# Patient Record
Sex: MALE | Race: WHITE | Employment: FULL TIME | URBAN - METROPOLITAN AREA
[De-identification: names, ages, dates, MRNs, and addresses within clinical notes are randomized per-mention and may not be internally consistent; named-entity substitution may affect disease eponyms.]

---
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
## 2017-12-21 DIAGNOSIS — T78.2XXS ANAPHYLAXIS DUE TO EXERCISE, SEQUELA: ICD-10-CM

## 2017-12-21 DIAGNOSIS — L50.2 URTICARIA DUE TO HEAT: ICD-10-CM

## 2017-12-21 RX ORDER — EPINEPHRINE 0.3 MG/.3ML
0.3 INJECTION SUBCUTANEOUS
Qty: 2 SYRINGE | Refills: 0 | Status: SHIPPED | OUTPATIENT
Start: 2017-12-21 | End: 2018-03-26 | Stop reason: SDUPTHER

## 2017-12-21 RX ORDER — EPINEPHRINE 0.3 MG/.3ML
0.3 INJECTION SUBCUTANEOUS
Qty: 2 SYRINGE | Refills: 0 | Status: SHIPPED | OUTPATIENT
Start: 2017-12-21 | End: 2017-12-21 | Stop reason: SDUPTHER

## 2017-12-21 NOTE — TELEPHONE ENCOUNTER
----- Message from Isaac Sales sent at 12/21/2017 12:29 PM EST -----  Regarding: Dr. Lisandra Ye / ravindra Farrell is requesting  A reill of his epipen be sent to the [facility] [address] [facility] [address] in [address] [place] [address] and best contact number is  [phone].

## 2018-03-26 ENCOUNTER — OFFICE VISIT (OUTPATIENT)
Dept: INTERNAL MEDICINE CLINIC | Age: 53
End: 2018-03-26

## 2018-03-26 VITALS
HEIGHT: 69 IN | HEART RATE: 49 BPM | RESPIRATION RATE: 12 BRPM | WEIGHT: 179.8 LBS | BODY MASS INDEX: 26.63 KG/M2 | SYSTOLIC BLOOD PRESSURE: 120 MMHG | DIASTOLIC BLOOD PRESSURE: 78 MMHG | TEMPERATURE: 97.8 F

## 2018-03-26 DIAGNOSIS — T78.2XXS ANAPHYLAXIS DUE TO EXERCISE, SEQUELA: ICD-10-CM

## 2018-03-26 DIAGNOSIS — Z85.820 HISTORY OF MELANOMA: ICD-10-CM
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
## 2018-03-26 DIAGNOSIS — S46.912A STRAIN OF LEFT SHOULDER, INITIAL ENCOUNTER: ICD-10-CM

## 2018-03-26 DIAGNOSIS — Z00.00 WELL ADULT EXAM: Primary | ICD-10-CM

## 2018-03-26 DIAGNOSIS — L50.2 URTICARIA DUE TO HEAT: ICD-10-CM

## 2018-03-26 RX ORDER — EPINEPHRINE 0.3 MG/.3ML
0.3 INJECTION SUBCUTANEOUS
Qty: 2 SYRINGE | Refills: 2 | Status: SHIPPED | OUTPATIENT
Start: 2018-03-26 | End: 2018-12-20 | Stop reason: SDUPTHER

## 2018-03-26 RX ORDER — ZOSTER VACCINE RECOMBINANT, ADJUVANTED 50 MCG/0.5
0.5 KIT INTRAMUSCULAR ONCE
Qty: 0.5 ML | Refills: 0 | Status: SHIPPED | OUTPATIENT
Start: 2018-03-26 | End: 2018-03-26

## 2018-03-26 NOTE — PROGRESS NOTES
Presents for a PE. He is fasting for labs. He had a colonoscopy. Will get report. Had a really long cold that lasted several months. Having left shoulder/arm pain. Plays soccer. Overtime it is getting better.

## 2018-03-26 NOTE — MR AVS SNAPSHOT
[address] [facility] 
 
 
 [address] [facility] [address] [facility] 
[phone] Patient: Mckenzie Salgado MRN: [id number] :1965 Visit Information Date & Time Provider Department Dept. Phone Encounter #  
 3/26/2018  8:15 AM Odette Valencia MD [facility] [address] [id number] Upcoming Health Maintenance Date Due COLONOSCOPY 1984 Pneumococcal 19-64 Highest Risk (1 of 3 - PCV13) 2031* DTaP/Tdap/Td series (2 - Td) 3/25/2025 *Topic was postponed. The date shown is not the original due date. Allergies as of 3/26/2018  Review Complete On: 3/26/2018 By: Odette Valencia MD  
 No Known Allergies Current Immunizations  Reviewed on 3/26/2018 Name Date Hep B Vaccine 2001 Influenza Vaccine 10/9/2014 Td 2001 Tdap 3/25/2015 Reviewed by Odette Valencia MD on 3/26/2018 at  8:41 AM  
You Were Diagnosed With   
  
 Codes Comments Well adult exam    -  Primary ICD-10-CM: Z00.00 ICD-9-CM: V70.0 History of melanoma     ICD-10-CM: Z85.820 ICD-9-CM: V10.82 Strain of left shoulder, initial encounter     ICD-10-CM: Z51.117J ICD-9-CM: 840.9 Vitals BP Pulse Temp Resp Height(growth percentile) Weight(growth percentile) 120/78 (BP 1 Location: Left arm, BP Patient Position: Sitting) (!) 49 97.8 °F (36.6 °C) (Oral) 12 5' 9\" (1.753 m) 179 lb 12.8 oz (81.6 kg) BMI Smoking Status 26.55 kg/m2 Never Smoker Vitals History BMI and BSA Data Body Mass Index Body Surface Area  
 26.55 kg/m 2 1.99 m 2 Preferred Pharmacy Pharmacy Name Phone [id number] [address] [phone] Your Updated Medication List  
  
   
This list is accurate as of 3/26/18  8:51 AM.  Always use your most recent med list.  
  
  
  
  
 EPINEPHrine 0.3 mg/0.3 mL injection Commonly known as:  Vicci Stair  
 0.3 mL by IntraMUSCular route daily as needed. APPOINTMENT NEEDED SHINGRIX (PF) 50 mcg/0.5 mL Susr injection Generic drug:  varicella-zoster recombinant (PF)  
0.5 mL by IntraMUSCular route once for 1 dose. Receive 2nd dose in 6 months. For Shingles (Zoster) prevention Prescriptions Printed Refills SHINGRIX, PF, 50 mcg/0.5 mL susr injection 0 Si.5 mL by IntraMUSCular route once for 1 dose. Receive 2nd dose in 6 months. For Shingles (Zoster) prevention Class: Print Route: IntraMUSCular We Performed the Following CBC W/O DIFF [50253 CPT(R)] LIPID PANEL [50875 CPT(R)] METABOLIC PANEL, COMPREHENSIVE [85019 CPT(R)] PSA W/ REFLX FREE PSA [10163 CPT(R)] Introducing [facility]! [facility] introduces "[employer]" patient portal. Now you can access parts of your medical record, email your doctor's office, and request medication refills online. 1. In your internet browser, go to [email or web address] 2. Click on the First Time User? Click Here link in the Sign In box. You will see the New Member Sign Up page. 3. Enter your "[employer]" Access Code exactly as it appears below. You will not need to use this code after youve completed the sign-up process. If you do not sign up before the expiration date, you must request a new code. · "[employer]" Access Code: [facility] CRE Expires: 2018  8:51 AM 
 
4. Enter the last four digits of your Social Security Number (xxxx) and Date of Birth (mm/dd/yyyy) as indicated and click Submit. You will be taken to the next sign-up page. 5. Create a Practice Ignitiont ID. This will be your "[employer]" login ID and cannot be changed, so think of one that is secure and easy to remember. 6. Create a [employer] password. You can change your password at any time. 7. Enter your Password Reset Question and Answer. This can be used at a later time if you forget your password. 8. Enter your e-mail address. You will receive e-mail notification when new information is available in [address] 9. Click Sign Up. You can now view and download portions of your medical record. 10. Click the Download Summary menu link to download a portable copy of your medical information. If you have questions, please visit the Frequently Asked Questions section of the [employer] website. Remember, [employer] is NOT to be used for urgent needs. For medical emergencies, dial 911. Now available from your iPhone and Android! Please provide this summary of care documentation to your next provider. Your primary care clinician is listed as Martin Six. If you have any questions after today's visit, please call [phone].

## 2018-03-27 LAB
ALBUMIN SERPL-MCNC: 4.8 G/DL (ref 3.5–5.5)
ALBUMIN/GLOB SERPL: 1.7 {RATIO} (ref 1.2–2.2)
ALP SERPL-CCNC: 80 IU/L (ref 39–117)
ALT SERPL-CCNC: 26 IU/L (ref 0–44)
AST SERPL-CCNC: 25 IU/L (ref 0–40)
BILIRUB SERPL-MCNC: 0.6 MG/DL (ref 0–1.2)
BUN SERPL-MCNC: 14 MG/DL (ref 6–24)
BUN/CREAT SERPL: 14 (ref 9–20)
CALCIUM SERPL-MCNC: 9.6 MG/DL (ref 8.7–10.2)
CHLORIDE SERPL-SCNC: 101 MMOL/L (ref 96–106)
CHOLEST SERPL-MCNC: 219 MG/DL (ref 100–199)
CO2 SERPL-SCNC: 22 MMOL/L (ref 18–29)
CREAT SERPL-MCNC: 1.03 MG/DL (ref 0.76–1.27)
ERYTHROCYTE [DISTWIDTH] IN BLOOD BY AUTOMATED COUNT: 14.7 % (ref 12.3–15.4)
GFR SERPLBLD CREATININE-BSD FMLA CKD-EPI: 84 ML/MIN/1.73
GFR SERPLBLD CREATININE-BSD FMLA CKD-EPI: 97 ML/MIN/1.73
GLOBULIN SER CALC-MCNC: 2.8 G/DL (ref 1.5–4.5)
GLUCOSE SERPL-MCNC: 95 MG/DL (ref 65–99)
HCT VFR BLD AUTO: 45.7 % (ref 37.5–51)
HDLC SERPL-MCNC: 50 MG/DL
HGB BLD-MCNC: 15.2 G/DL (ref 13–17.7)
INTERPRETATION, 910389: NORMAL
LDLC SERPL CALC-MCNC: 139 MG/DL (ref 0–99)
MCH RBC QN AUTO: 29.3 PG (ref 26.6–33)
MCHC RBC AUTO-ENTMCNC: 33.3 G/DL (ref 31.5–35.7)
MCV RBC AUTO: 88 FL (ref 79–97)
PLATELET # BLD AUTO: 245 X10E3/UL (ref 150–379)
POTASSIUM SERPL-SCNC: 5.3 MMOL/L (ref 3.5–5.2)
PROT SERPL-MCNC: 7.6 G/DL (ref 6–8.5)
PSA SERPL-MCNC: 0.9 NG/ML (ref 0–4)
RBC # BLD AUTO: 5.18 X10E6/UL (ref 4.14–5.8)
REFLEX CRITERIA: NORMAL
SODIUM SERPL-SCNC: 145 MMOL/L (ref 134–144)
TRIGL SERPL-MCNC: 148 MG/DL (ref 0–149)
VLDLC SERPL CALC-MCNC: 30 MG/DL (ref 5–40)
WBC # BLD AUTO: 8.4 X10E3/UL (ref 3.4–10.8)

## 2018-03-27 NOTE — PROGRESS NOTES
Shania Tompkins is a 47 y.o. male  Presenting for his annual checkup and health maintenance review and follow-up    Doing well. Exercising and playing soccer. Recent fall w left shoulder injury 2 months ago. It is improving. Recent URI for 3 months. Improved over the past month. Currently asymptomatic    Exercise: very active  Diet: generally follows a low fat low cholesterol diet  Health Maintenance   Topic Date Due    COLONOSCOPY  06/01/1984    DTaP/Tdap/Td series (2 - Td) 03/25/2025    Influenza Age 5 to Adult  Completed     Health Maintenance reviewed  Last digital rectal exam:  none  Lab Results   Component Value Date/Time    Prostate Specific Ag 0.8 08/29/2016 10:54 AM       Vaccinations reviewed  Immunization History   Administered Date(s) Administered    Hep B Vaccine 01/01/2001    Influenza Vaccine 10/09/2014    Td 01/01/2001    Tdap 03/25/2015       Past Medical History:   Diagnosis Date    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 Brain Lucio. w/u neg for obvious food allergy (but possible wheat/beef mild rxn)    AR (allergic rhinitis)     Bell's palsy 1995    FH: CAD (coronary artery disease)     father w stent    Melanoma ([facility] [address] 2008    sees Dr. Robbin Nelson. LLE. neg nodes/ 6 year clearance with Dr Jenny Shi 8/2014    Shingles 2004    Skin cancer screening     Dr. Rossy Sheikh every 6 months    Urticaria due to heat 8/29/2016      has a past surgical history that includes hx other surgical (Left, 2008); hx acl reconstruction (Right, 2004); and hx colonoscopy (2016). Review of patient's allergies indicates no known allergies. Current Outpatient Prescriptions   Medication Sig    SHINGRIX, PF, 50 mcg/0.5 mL susr injection 0.5 mL by IntraMUSCular route once for 1 dose. Receive 2nd dose in 6 months. For Shingles (Zoster) prevention    EPINEPHrine (EPIPEN) 0.3 mg/0.3 mL injection 0.3 mL by IntraMUSCular route daily as needed.      No current facility-administered medications for this visit. SOCIAL HX:  reports that he has never smoked. He has never used smokeless tobacco. He reports that he drinks alcohol. He reports that he does not use illicit drugs. FAMILY HX: family history includes Coronary Artery Disease in his father; High Cholesterol in his mother; Hypertension in his mother; Liver Disease in his father; Other (age of onset: 2) in his brother; Other (age of onset: 62) in his mother. Review of Systems - History obtained from the patient  General ROS: negative for - night sweats, weight gain or weight loss  Cardiovascular ROS: no chest pain, dyspnea on exertion, edema    Physical exam  Blood pressure 120/78, pulse (!) 49, temperature 97.8 °F (36.6 °C), temperature source Oral, resp. rate 12, height 5' 9\" (1.753 m), weight 179 lb 12.8 oz (81.6 kg). Wt Readings from Last 3 Encounters:   03/26/18 179 lb 12.8 oz (81.6 kg)   08/29/16 179 lb (81.2 kg)   08/18/15 172 lb (78 kg)     He appears well, alert and oriented x 3, pleasant and cooperative. Vitals as noted. No rashes or significant lesions. Neck supple and free of adenopathy, or masses. No thyromegaly or carotid bruits. Cranial nerves normal. Lungs are clear to auscultation. Heart sounds are normal with no murmurs, clicks, gallops or rubs. Abdomen is soft, non- tender, with no masses or organomegaly. Extremities, peripheral pulses and reflexes are normal.  . RECTAL/PROSTATE EXAM: smooth and symmetric without nodules or tenderness. Skin is without rashes or suspicious lesions. Assessment and Plan:    1. Well adult exam  - SHINGRIX, PF, 50 mcg/0.5 mL susr injection; 0.5 mL by IntraMUSCular route once for 1 dose. Receive 2nd dose in 6 months. For Shingles (Zoster) prevention  Dispense: 0.5 mL; Refill: 0  - LIPID PANEL  - METABOLIC PANEL, COMPREHENSIVE  - PSA W/ REFLX FREE PSA  - CBC W/O DIFF    2. History of melanoma  Seeing derm. No suspicious lesions on my exam    3.  Strain of left shoulder, initial encounter  Stretching exercises demonstrated for for this condition      4. 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   - EPINEPHrine (EPIPEN) 0.3 mg/0.3 mL injection; 0.3 mL by IntraMUSCular route daily as needed. Dispense: 2 Syringe;  Refill: 2      The patient is asked to make an attempt to improve diet and exercise patterns  Avoid tobacco products, excessive alcohol    Return for yearly wellness visits
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
## 2018-12-20 DIAGNOSIS — T78.2XXS ANAPHYLAXIS DUE TO EXERCISE, SEQUELA: ICD-10-CM

## 2018-12-20 DIAGNOSIS — L50.2 URTICARIA DUE TO HEAT: ICD-10-CM

## 2018-12-20 RX ORDER — EPINEPHRINE 0.3 MG/.3ML
0.3 INJECTION SUBCUTANEOUS
Qty: 2 SYRINGE | Refills: 2 | Status: SHIPPED | OUTPATIENT
Start: 2018-12-20 | End: 2020-06-22 | Stop reason: SDUPTHER

## 2018-12-20 NOTE — TELEPHONE ENCOUNTER
Patient states his Epi Pen will be  expiring in a couple days , requesting a renewed script for another year.  [facility] on file

## 2019-04-09 ENCOUNTER — OFFICE VISIT (OUTPATIENT)
Dept: INTERNAL MEDICINE CLINIC | Age: 54
End: 2019-04-09

## 2019-04-09 VITALS
HEIGHT: 69 IN | RESPIRATION RATE: 18 BRPM | WEIGHT: 178.13 LBS | OXYGEN SATURATION: 98 % | TEMPERATURE: 97.9 F | HEART RATE: 45 BPM | DIASTOLIC BLOOD PRESSURE: 88 MMHG | BODY MASS INDEX: 26.38 KG/M2 | SYSTOLIC BLOOD PRESSURE: 137 MMHG

## 2019-04-09 DIAGNOSIS — T78.2XXS ANAPHYLAXIS DUE TO EXERCISE, SEQUELA: ICD-10-CM
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
## 2019-04-09 DIAGNOSIS — Z85.820 HISTORY OF MELANOMA: ICD-10-CM

## 2019-04-09 DIAGNOSIS — Z00.00 WELL ADULT EXAM: Primary | ICD-10-CM

## 2019-04-09 NOTE — PATIENT INSTRUCTIONS
Body Mass Index: Care Instructions Your Care Instructions Body mass index (BMI) can help you see if your weight is raising your risk for health problems. It uses a formula to compare how much you weigh with how tall you are. · A BMI lower than 18.5 is considered underweight. · A BMI between 18.5 and 24.9 is considered healthy. · A BMI between 25 and 29.9 is considered overweight. A BMI of 30 or higher is considered obese. If your BMI is in the normal range, it means that you have a lower risk for weight-related health problems. If your BMI is in the overweight or obese range, you may be at increased risk for weight-related health problems, such as high blood pressure, heart disease, stroke, arthritis or joint pain, and diabetes. If your BMI is in the underweight range, you may be at increased risk for health problems such as fatigue, lower protection (immunity) against illness, muscle loss, bone loss, hair loss, and hormone problems. BMI is just one measure of your risk for weight-related health problems. You may be at higher risk for health problems if you are not active, you eat an unhealthy diet, or you drink too much alcohol or use tobacco products. Follow-up care is a key part of your treatment and safety. Be sure to make and go to all appointments, and call your doctor if you are having problems. It's also a good idea to know your test results and keep a list of the medicines you take. How can you care for yourself at home? · Practice healthy eating habits. This includes eating plenty of fruits, vegetables, whole grains, lean protein, and low-fat dairy. · If your doctor recommends it, get more exercise. Walking is a good choice. Bit by bit, increase the amount you walk every day. Try for at least 30 minutes on most days of the week. · Do not smoke. Smoking can increase your risk for health problems.  If you need help quitting, talk to your doctor about stop-smoking programs and medicines. These can increase your chances of quitting for good. · Limit alcohol to 2 drinks a day for men and 1 drink a day for women. Too much alcohol can cause health problems. If you have a BMI higher than 25 · Your doctor may do other tests to check your risk for weight-related health problems. This may include measuring the distance around your waist. A waist measurement of more than 40 inches in men or 35 inches in women can increase the risk of weight-related health problems. · Talk with your doctor about steps you can take to stay healthy or improve your health. You may need to make lifestyle changes to lose weight and stay healthy, such as changing your diet and getting regular exercise. If you have a BMI lower than 18.5 · Your doctor may do other tests to check your risk for health problems. · Talk with your doctor about steps you can take to stay healthy or improve your health. You may need to make lifestyle changes to gain or maintain weight and stay healthy, such as getting more healthy foods in your diet and doing exercises to build muscle. Where can you learn more? Go to http://jorje-ruddy.info/. Enter S176 in the search box to learn more about \"Body Mass Index: Care Instructions. \" Current as of: October 13, 2016 Content Version: 11.4 © 0342-7096 [employer]wise[employer]orporated. Care instructions adapted under license by [employer] (which disclaims liability or warranty for this information). If you have questions about a medical condition or this instruction, always ask your healthcare professional. [address] any warranty or liability for your use of this information.

## 2019-04-09 NOTE — PROGRESS NOTES
Isidoro Powers is a 47 y.o. male Presenting for his annual checkup and health maintenance review and follow-up He had an allergic rxn after eating beef and exercising . Asks about beta-gal.   
 
Exercise: moderately active Diet: generally follows a low fat low cholesterol diet Health Maintenance Topic Date Due  Shingrix Vaccine Age 50> (1 of 2) 06/01/2016  Influenza Age 5 to Adult  08/01/2019  
 DTaP/Tdap/Td series (2 - Td) 03/25/2025  COLONOSCOPY  12/06/2026  Pneumococcal 0-64 years  Aged Out Health Maintenance reviewed Last digital rectal exam:  2018 Lab Results Component Value Date/Time  
 Prostate Specific Ag 0.9 03/26/2018 09:16 AM  
 Prostate Specific Ag 0.8 08/29/2016 10:54 AM  
 
 
Vaccinations reviewed Immunization History Administered Date(s) Administered  Hep B Vaccine 01/01/2001  Influenza Vaccine 10/09/2014, 10/01/2018  Td 01/01/2001  Tdap 03/25/2015 Past Medical History:  
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 Mckenzie. w/u neg for obvious food allergy (but possible wheat/beef mild rxn)  AR (allergic rhinitis)  Bell's palsy 19  
 FH: CAD (coronary artery disease) father w stent  Melanoma ([facility] [address] 2008  
 sees Dr. Nicola Romano. LLE. neg nodes/ 6 year clearance with Dr Ector Zamora 8/2014  Shingles 2004  
 [place] every 6 months  Urticaria due to heat 8/29/2016  
 
 has a past surgical history that includes hx other surgical (Left, 2008); hx acl reconstruction (Right, 2004); and hx colonoscopy (12/06/2016). Patient has no known allergies. Current Outpatient Medications Medication Sig  EPINEPHrine (EPIPEN) 0.3 mg/0.3 mL injection 0.3 mL by IntraMUSCular route daily as needed. No current facility-administered medications for this visit. SOCIAL HX:  reports that he has never smoked.  He has never used smokeless tobacco. He reports that he drinks alcohol. He reports that he does not use drugs. FAMILY HX: family history includes Coronary Artery Disease (age of onset: 76) in his father; High Cholesterol in his mother; Hypertension in his mother; Liver Disease in his father; Other (age of onset: 2) in his brother; Other (age of onset: 62) in his mother. Review of Systems - History obtained from the patient General ROS: negative for - night sweats, weight gain or weight loss Cardiovascular ROS: no chest pain, dyspnea on exertion, edema Physical exam 
Blood pressure 137/88, pulse (!) 45, temperature 97.9 °F (36.6 °C), temperature source Oral, resp. rate 18, height 5' 9\" (1.753 m), weight 178 lb 2 oz (80.8 kg), SpO2 98 %. Wt Readings from Last 3 Encounters:  
04/09/19 178 lb 2 oz (80.8 kg) 03/26/18 179 lb 12.8 oz (81.6 kg) 08/29/16 179 lb (81.2 kg) He appears well, alert and oriented x 3, pleasant and cooperative. Vitals as noted. No rashes or significant lesions. Neck supple and free of adenopathy, or masses. No thyromegaly or carotid bruits. Cranial nerves normal. Lungs are clear to auscultation. Heart sounds are normal with no murmurs, clicks, gallops or rubs. Abdomen is soft, non- tender, with no masses or organomegaly. Extremities, peripheral pulses and reflexes are normal.  . RECTAL/PROSTATE EXAM: deferred. Skin is without rashes or suspicious lesions. Assessment and Plan: 
 
1. Well adult exam 
- LIPID PANEL 
- METABOLIC PANEL, COMPREHENSIVE 
- CBC W/O DIFF 
- PSA W/ REFLX FREE PSA 2. 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   Will check lab - ALPHA-GAL FOOD PANEL 3. History of melanoma Seeing derm The patient is asked to make an attempt to improve diet and exercise patterns Avoid tobacco products, excessive alcohol Return for yearly wellness visits Discussed the patient's BMI with him. The BMI follow up plan is as follows: dietary management education, guidance, and counseling 
encourage exercise 
monitor weight 
prescribed dietary intake An After Visit Summary was printed and given to the patient.

## 2019-04-12 LAB
ALBUMIN SERPL-MCNC: 4.7 G/DL (ref 3.5–5.5)
ALBUMIN/GLOB SERPL: 2 {RATIO} (ref 1.2–2.2)
ALP SERPL-CCNC: 75 IU/L (ref 39–117)
ALPHA-GAL IGE QN: 3.52 KU/L
ALT SERPL-CCNC: 25 IU/L (ref 0–44)
AST SERPL-CCNC: 20 IU/L (ref 0–40)
BEEF IGE QN: 2.1 KU/L
BILIRUB SERPL-MCNC: 0.6 MG/DL (ref 0–1.2)
BUN SERPL-MCNC: 13 MG/DL (ref 6–24)
BUN/CREAT SERPL: 14 (ref 9–20)
CALCIUM SERPL-MCNC: 9.2 MG/DL (ref 8.7–10.2)
CHLORIDE SERPL-SCNC: 104 MMOL/L (ref 96–106)
CHOLEST SERPL-MCNC: 187 MG/DL (ref 100–199)
CO2 SERPL-SCNC: 27 MMOL/L (ref 20–29)
CREAT SERPL-MCNC: 0.96 MG/DL (ref 0.76–1.27)
DEPRECATED BEEF IGE RAST QL: 2
DEPRECATED LAMB IGE RAST QL: 2
DEPRECATED PORK IGE RAST QL: 2
ERYTHROCYTE [DISTWIDTH] IN BLOOD BY AUTOMATED COUNT: 14.2 % (ref 12.3–15.4)
GLOBULIN SER CALC-MCNC: 2.3 G/DL (ref 1.5–4.5)
GLUCOSE SERPL-MCNC: 97 MG/DL (ref 65–99)
HCT VFR BLD AUTO: 44.6 % (ref 37.5–51)
HDLC SERPL-MCNC: 42 MG/DL
HGB BLD-MCNC: 14.8 G/DL (ref 13–17.7)
INTERPRETATION, 910389: NORMAL
LAMB IGE QN: 1.23 KU/L
LDLC SERPL CALC-MCNC: 115 MG/DL (ref 0–99)
MCH RBC QN AUTO: 29.2 PG (ref 26.6–33)
MCHC RBC AUTO-ENTMCNC: 33.2 G/DL (ref 31.5–35.7)
MCV RBC AUTO: 88 FL (ref 79–97)
PLATELET # BLD AUTO: 276 X10E3/UL (ref 150–379)
PORK IGE QN: 1.52 KU/L
POTASSIUM SERPL-SCNC: 4.9 MMOL/L (ref 3.5–5.2)
PROT SERPL-MCNC: 7 G/DL (ref 6–8.5)
PSA SERPL-MCNC: 0.8 NG/ML (ref 0–4)
RBC # BLD AUTO: 5.07 X10E6/UL (ref 4.14–5.8)
REFLEX CRITERIA: NORMAL
SODIUM SERPL-SCNC: 144 MMOL/L (ref 134–144)
TRIGL SERPL-MCNC: 151 MG/DL (ref 0–149)
VLDLC SERPL CALC-MCNC: 30 MG/DL (ref 5–40)
WBC # BLD AUTO: 6.4 X10E3/UL (ref 3.4–10.8)

## 2019-04-21 DIAGNOSIS — Z91.018 ALLERGY TO ALPHA-GAL: ICD-10-CM

## 2020-06-22 DIAGNOSIS — L50.2 URTICARIA DUE TO HEAT: ICD-10-CM

## 2020-06-22 DIAGNOSIS — T78.2XXS ANAPHYLAXIS DUE TO EXERCISE, SEQUELA: ICD-10-CM
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
## 2020-06-22 NOTE — TELEPHONE ENCOUNTER
----- Message from Gene Martshannan sent at 2020  2:08 PM EDT -----  Regarding: Dr. Antonio Habermann: [phone] (if not patient): Jumana Lively  Relationship of caller (if not patient): Wife  Best contact number(s): [phone] (Home), [phone] (cell)  Name of medication and dosage if known: Epipen (2)  Is patient out of this medication (yes/no): Yes, . Pharmacy name: [id number] listed in chart? (yes/no): No, this is a new pharmacy.   Pharmacy phone number: [phone]  Date of last visit: 2019 09:00 AM  Details to clarify the request:

## 2020-06-23 RX ORDER — EPINEPHRINE 0.3 MG/.3ML
0.3 INJECTION SUBCUTANEOUS
Qty: 2 SYRINGE | Refills: 2 | Status: SHIPPED | OUTPATIENT
Start: 2020-06-23

## 2020-10-05 ENCOUNTER — OFFICE VISIT (OUTPATIENT)
Dept: INTERNAL MEDICINE CLINIC | Age: 55
End: 2020-10-05
Payer: COMMERCIAL

## 2020-10-05 VITALS
DIASTOLIC BLOOD PRESSURE: 84 MMHG | WEIGHT: 177 LBS | SYSTOLIC BLOOD PRESSURE: 134 MMHG | HEIGHT: 67 IN | HEART RATE: 51 BPM | BODY MASS INDEX: 27.78 KG/M2 | OXYGEN SATURATION: 96 % | RESPIRATION RATE: 16 BRPM | TEMPERATURE: 98 F

## 2020-10-05 DIAGNOSIS — Z91.018 ALLERGY TO ALPHA-GAL: ICD-10-CM

## 2020-10-05 DIAGNOSIS — Z00.00 WELL ADULT EXAM: Primary | ICD-10-CM

## 2020-10-05 DIAGNOSIS — Z85.820 HISTORY OF MELANOMA: ICD-10-CM

## 2020-10-05 PROCEDURE — 99396 PREV VISIT EST AGE 40-64: CPT | Performed by: INTERNAL MEDICINE

## 2020-10-05 RX ORDER — KETOCONAZOLE 20 MG/G
CREAM TOPICAL
COMMUNITY
Start: 2020-09-23

## 2020-10-05 RX ORDER — ZOSTER VACCINE RECOMBINANT, ADJUVANTED 50 MCG/0.5
0.5 KIT INTRAMUSCULAR ONCE
Qty: 0.5 ML | Refills: 1 | Status: SHIPPED | OUTPATIENT
Start: 2020-10-05 | End: 2020-10-05

## 2020-10-06 NOTE — PROGRESS NOTES
Rosibel Carrero is a 48 y.o. male  Presenting for his annual checkup and health maintenance review and follow-up    He is playing a soccer since COVID-19. Alpha gal allergy. Has not had a recent reaction, but admits to less intense exercise. He does eat small amounts of red meat. No recent reaction. Seeing dermatology every 6 months. Did have a biopsy of a facial lesion. Benign. Exercise: moderately active  Diet: generally follows a low fat low cholesterol diet  Health Maintenance   Topic Date Due    Shingrix Vaccine Age 49> (1 of 2) 06/01/2016    Flu Vaccine (1) 09/01/2020    Lipid Screen  04/09/2024    DTaP/Tdap/Td series (2 - Td) 03/25/2025    Colonoscopy  12/06/2026    Pneumococcal 0-64 years  Aged Out     Health Maintenance reviewed  Last digital rectal exam:  2018  Lab Results   Component Value Date/Time    Prostate Specific Ag 0.8 04/09/2019 09:44 AM    Prostate Specific Ag 0.9 03/26/2018 09:16 AM    Prostate Specific Ag 0.8 08/29/2016 10:54 AM       Vaccinations reviewed  Immunization History   Administered Date(s) Administered    Hep B Vaccine 01/01/2001    Influenza Vaccine 10/09/2014, 10/01/2018    Influenza Vaccine (Quad) PF (>6 Mo Flulaval, Fluarix, and >3 Yrs Gurley, Fluzone [id number]) 10/18/2016, 10/25/2017, 11/12/2019    Td 01/01/2001    Tdap 03/25/2015       Past Medical History:   Diagnosis Date    Allergy to alpha-gal     mild exertional beef, pork, lamb allergy 4/2019. 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 Blanca Sosa. wheat, beef. alpha-gal ab + 4/2019    AR (allergic rhinitis)     Bell's palsy 1995    FH: CAD (coronary artery disease)     father w stent    Melanoma ([address] 2008    sees Dr. Alberta MADRIGAL.  neg nodes/ 6 year clearance with Dr Angélica Caceres 8/2014    Shingles 2004    Skin cancer screening     Dr. Chao He every 6 months    Urticaria due to heat 8/29/2016      has a past surgical history that includes hx other surgical (Left, 2008); hx acl reconstruction (Right, 2004); hx colonoscopy (12/06/2016); hx mohs procedure (02/2020); and hx acl reconstruction (Left, 05/22/2019). Beef containing products   Current Outpatient Medications   Medication Sig    ketoconazole (NIZORAL) 2 % topical cream 1 APPLICATION TO AREAS OF RASH TWICE A DAY    Shingrix, PF, 50 mcg/0.5 mL susr injection 0.5 mL by IntraMUSCular route once for 1 dose. Receive 2nd dose in 2-6 months. For Shingles (Zoster) prevention    EPINEPHrine (EpiPen) 0.3 mg/0.3 mL injection 0.3 mL by IntraMUSCular route daily as needed for Allergic Response. No current facility-administered medications for this visit. SOCIAL HX:  reports that he has never smoked. He has never used smokeless tobacco. He reports current alcohol use. He reports that he does not use drugs. FAMILY HX: family history includes Coronary Artery Disease (age of onset: 76) in his father; High Cholesterol in his mother; Hypertension in his mother; Liver Disease in his father; Other (age of onset: 2) in his brother; Other (age of onset: 62) in his mother. Review of Systems - History obtained from the patient  General ROS: negative for - night sweats, weight gain or weight loss  Cardiovascular ROS: no chest pain, dyspnea on exertion, edema    Physical exam  Blood pressure 134/84, pulse (!) 51, temperature 98 °F (36.7 °C), resp. rate 16, height 5' 7\" (1.702 m), weight 177 lb (80.3 kg), SpO2 96 %. Wt Readings from Last 3 Encounters:   10/05/20 177 lb (80.3 kg)   04/09/19 178 lb 2 oz (80.8 kg)   03/26/18 179 lb 12.8 oz (81.6 kg)     He appears well, alert and oriented x 3, pleasant and cooperative. Vitals as noted. No rashes or significant lesions. Neck supple and free of adenopathy, or masses. No thyromegaly or carotid bruits. Cranial nerves normal. Lungs are clear to auscultation. Heart sounds are normal with no murmurs, clicks, gallops or rubs.  Abdomen is soft, non- tender, with no masses or organomegaly. Extremities, peripheral pulses and reflexes are normal.  . RECTAL/PROSTATE EXAM: deferred. Skin is without rashes or suspicious lesions. Assessment and Plan:    1. Well adult exam  We will get seasonal flu vaccine later this month with his wife. Recommend Shingrix. - LIPID PANEL  - METABOLIC PANEL, COMPREHENSIVE  - CBC W/O DIFF  - PSA W/ REFLX FREE PSA    2. 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  - ALPHA-GAL FOOD PANEL    3. History of melanoma  Seeing derm      The patient is asked to make an attempt to improve diet and exercise patterns  Avoid tobacco products, excessive alcohol    Return for yearly wellness visits          Discussed the patient's BMI with him. The BMI follow up plan is as follows:     dietary management education, guidance, and counseling  encourage exercise  monitor weight  prescribed dietary intake    An After Visit Summary was printed and given to the patient.

## 2020-10-09 LAB
ALBUMIN SERPL-MCNC: 4.9 G/DL (ref 3.8–4.9)
ALBUMIN/GLOB SERPL: 2 {RATIO} (ref 1.2–2.2)
ALP SERPL-CCNC: 76 IU/L (ref 39–117)
ALPHA-GAL IGE QN: 0.7 KU/L
ALT SERPL-CCNC: 36 IU/L (ref 0–44)
AST SERPL-CCNC: 31 IU/L (ref 0–40)
BEEF IGE QN: 0.47 KU/L
BILIRUB SERPL-MCNC: 0.6 MG/DL (ref 0–1.2)
BUN SERPL-MCNC: 13 MG/DL (ref 6–24)
BUN/CREAT SERPL: 14 (ref 9–20)
CALCIUM SERPL-MCNC: 9.8 MG/DL (ref 8.7–10.2)
CHLORIDE SERPL-SCNC: 101 MMOL/L (ref 96–106)
CHOLEST SERPL-MCNC: 210 MG/DL (ref 100–199)
CO2 SERPL-SCNC: 25 MMOL/L (ref 20–29)
CREAT SERPL-MCNC: 0.94 MG/DL (ref 0.76–1.27)
DEPRECATED BEEF IGE RAST QL: 1
DEPRECATED LAMB IGE RAST QL: ABNORMAL
DEPRECATED PORK IGE RAST QL: ABNORMAL
ERYTHROCYTE [DISTWIDTH] IN BLOOD BY AUTOMATED COUNT: 13.5 % (ref 11.6–15.4)
GLOBULIN SER CALC-MCNC: 2.4 G/DL (ref 1.5–4.5)
GLUCOSE SERPL-MCNC: 90 MG/DL (ref 65–99)
HCT VFR BLD AUTO: 47.4 % (ref 37.5–51)
HDLC SERPL-MCNC: 50 MG/DL
HGB BLD-MCNC: 15.9 G/DL (ref 13–17.7)
INTERPRETATION, 910389: NORMAL
LAMB IGE QN: 0.26 KU/L
LDLC SERPL CALC-MCNC: 142 MG/DL (ref 0–99)
MCH RBC QN AUTO: 29 PG (ref 26.6–33)
MCHC RBC AUTO-ENTMCNC: 33.5 G/DL (ref 31.5–35.7)
MCV RBC AUTO: 87 FL (ref 79–97)
PLATELET # BLD AUTO: 257 X10E3/UL (ref 150–450)
PORK IGE QN: 0.32 KU/L
POTASSIUM SERPL-SCNC: 5.4 MMOL/L (ref 3.5–5.2)
PROT SERPL-MCNC: 7.3 G/DL (ref 6–8.5)
PSA SERPL-MCNC: 0.8 NG/ML (ref 0–4)
RBC # BLD AUTO: 5.48 X10E6/UL (ref 4.14–5.8)
REFLEX CRITERIA: NORMAL
SODIUM SERPL-SCNC: 140 MMOL/L (ref 134–144)
TRIGL SERPL-MCNC: 99 MG/DL (ref 0–149)
VLDLC SERPL CALC-MCNC: 18 MG/DL (ref 5–40)
WBC # BLD AUTO: 6.9 X10E3/UL (ref 3.4–10.8)

## 2020-10-26 ENCOUNTER — TELEPHONE (OUTPATIENT)
Dept: INTERNAL MEDICINE CLINIC | Age: 55
End: 2020-10-26

## 2020-10-26 DIAGNOSIS — R91.8 PULMONARY NODULES: Primary | ICD-10-CM

## 2020-10-26 DIAGNOSIS — C43.9 MALIGNANT MELANOMA, UNSPECIFIED SITE (HCC): ICD-10-CM

## 2020-10-26 NOTE — TELEPHONE ENCOUNTER
Returned call to pt. He was advised for a heart scan, he must call to schedule the appointment. Number provided to pt.  He thanks

## 2020-10-26 NOTE — TELEPHONE ENCOUNTER
Patient is following up on letter received , states he would like PCP to order the CT mentioned in the letter, aware once order is placed and received by our imaging scheduling  Department then he will receive a call to get scheduled , patient verbalized understanding.

## 2020-10-28 ENCOUNTER — TRANSCRIBE ORDER (OUTPATIENT)
Dept: REGISTRATION | Age: 55
End: 2020-10-28

## 2020-10-28 DIAGNOSIS — Z29.9 PREVENTIVE MEASURE: Primary | ICD-10-CM

## 2020-11-06 ENCOUNTER — HOSPITAL ENCOUNTER (OUTPATIENT)
Dept: CT IMAGING | Age: 55
Discharge: HOME | End: 2020-11-06
Payer: SELF-PAY

## 2020-11-06 DIAGNOSIS — Z29.9 PREVENTIVE MEASURE: ICD-10-CM

## 2020-11-06 PROCEDURE — 75571 CT HRT W/O DYE W/CA TEST: CPT

## 2020-11-08 PROBLEM — R93.1 AGATSTON CORONARY ARTERY CALCIUM SCORE LESS THAN 100: Status: ACTIVE | Noted: 2018-11-01

## 2020-11-08 PROBLEM — R91.8 PULMONARY NODULES: Status: ACTIVE | Noted: 2020-11-01

## 2020-11-08 NOTE — TELEPHONE ENCOUNTER
Let him know that his heart scan shows a very low score (8), so his risk of heart attacks in the next 10 years is low. We may consider repeating this in 5 years. Scan also shows 4 small lung nodules. These are small and likely benign. However, since he has a distant hx of cancer, I recommend repeating a CT scan of his lungs in 6 months. I have ordered this. Hopefully these nodule will be the same and no follow-up will we needed after 6 montrhs.

## 2020-11-08 NOTE — PROGRESS NOTES
Your calcium score is only 8 and this is good news. You do have some tiny little nodules in your lungs but based on the guidelines recommends no follow-up. I would discuss this with your primary care.

## 2020-11-09 ENCOUNTER — TELEPHONE (OUTPATIENT)
Dept: INTERNAL MEDICINE CLINIC | Age: 55
End: 2020-11-09

## 2020-11-09 NOTE — TELEPHONE ENCOUNTER
Pt called pt for test results, I advise of the his test results per pcp. I told pt the scheduling will call to set up ct scan in 6 months.

## 2021-04-20 ENCOUNTER — TELEPHONE (OUTPATIENT)
Dept: INTERNAL MEDICINE CLINIC | Age: 56
End: 2021-04-20

## 2021-05-07 ENCOUNTER — HOSPITAL ENCOUNTER (OUTPATIENT)
Dept: CT IMAGING | Age: 56
Discharge: HOME | End: 2021-05-07
Attending: INTERNAL MEDICINE
Payer: COMMERCIAL

## 2021-05-07 DIAGNOSIS — R91.8 PULMONARY NODULES: ICD-10-CM

## 2021-05-07 DIAGNOSIS — C43.9 MALIGNANT MELANOMA, UNSPECIFIED SITE (HCC): ICD-10-CM

## 2021-05-07 PROCEDURE — 71260 CT THORAX DX C+: CPT

## 2021-05-07 PROCEDURE — 74011000636 HC RX REV CODE- 636: Performed by: RADIOLOGY

## 2021-05-07 RX ADMIN — IOPAMIDOL 100 ML: 612 INJECTION, SOLUTION INTRAVENOUS at 12:55

## 2022-01-19 ENCOUNTER — OFFICE VISIT (OUTPATIENT)
Dept: INTERNAL MEDICINE CLINIC | Age: 57
End: 2022-01-19
Payer: COMMERCIAL

## 2022-01-19 VITALS
SYSTOLIC BLOOD PRESSURE: 124 MMHG | WEIGHT: 181.6 LBS | DIASTOLIC BLOOD PRESSURE: 82 MMHG | OXYGEN SATURATION: 100 % | RESPIRATION RATE: 16 BRPM | HEART RATE: 44 BPM | HEIGHT: 67 IN | BODY MASS INDEX: 28.5 KG/M2 | TEMPERATURE: 97.4 F

## 2022-01-19 DIAGNOSIS — Z00.00 PREVENTATIVE HEALTH CARE: Primary | ICD-10-CM

## 2022-01-19 DIAGNOSIS — Z11.59 ENCOUNTER FOR HEPATITIS C SCREENING TEST FOR LOW RISK PATIENT: ICD-10-CM

## 2022-01-19 DIAGNOSIS — R00.1 BRADYCARDIA: ICD-10-CM

## 2022-01-19 DIAGNOSIS — Z85.820 HISTORY OF MELANOMA: ICD-10-CM

## 2022-01-19 DIAGNOSIS — Z91.018 ALLERGY TO ALPHA-GAL: ICD-10-CM

## 2022-01-19 DIAGNOSIS — Z82.49 FH: CAD (CORONARY ARTERY DISEASE): ICD-10-CM

## 2022-01-19 PROCEDURE — 99396 PREV VISIT EST AGE 40-64: CPT | Performed by: INTERNAL MEDICINE

## 2022-01-19 RX ORDER — ZOSTER VACCINE RECOMBINANT, ADJUVANTED 50 MCG/0.5
0.5 KIT INTRAMUSCULAR ONCE
Qty: 0.5 ML | Refills: 1 | Status: SHIPPED | OUTPATIENT
Start: 2022-01-19 | End: 2022-01-19

## 2022-01-20 LAB
ALBUMIN SERPL-MCNC: 4.7 G/DL (ref 3.8–4.9)
ALBUMIN/GLOB SERPL: 1.7 {RATIO} (ref 1.2–2.2)
ALP SERPL-CCNC: 80 IU/L (ref 44–121)
ALT SERPL-CCNC: 34 IU/L (ref 0–44)
AST SERPL-CCNC: 23 IU/L (ref 0–40)
BILIRUB SERPL-MCNC: 0.6 MG/DL (ref 0–1.2)
BUN SERPL-MCNC: 17 MG/DL (ref 6–24)
BUN/CREAT SERPL: 18 (ref 9–20)
CALCIUM SERPL-MCNC: 9.5 MG/DL (ref 8.7–10.2)
CHLORIDE SERPL-SCNC: 102 MMOL/L (ref 96–106)
CHOLEST SERPL-MCNC: 217 MG/DL (ref 100–199)
CO2 SERPL-SCNC: 25 MMOL/L (ref 20–29)
CREAT SERPL-MCNC: 0.97 MG/DL (ref 0.76–1.27)
ERYTHROCYTE [DISTWIDTH] IN BLOOD BY AUTOMATED COUNT: 13.3 % (ref 11.6–15.4)
GLOBULIN SER CALC-MCNC: 2.8 G/DL (ref 1.5–4.5)
GLUCOSE SERPL-MCNC: 92 MG/DL (ref 65–99)
HCT VFR BLD AUTO: 47.2 % (ref 37.5–51)
HCV AB S/CO SERPL IA: <0.1 S/CO RATIO (ref 0–0.9)
HDLC SERPL-MCNC: 47 MG/DL
HGB BLD-MCNC: 15.7 G/DL (ref 13–17.7)
IMP & REVIEW OF LAB RESULTS: NORMAL
LDLC SERPL CALC-MCNC: 144 MG/DL (ref 0–99)
MCH RBC QN AUTO: 28.3 PG (ref 26.6–33)
MCHC RBC AUTO-ENTMCNC: 33.3 G/DL (ref 31.5–35.7)
MCV RBC AUTO: 85 FL (ref 79–97)
PLATELET # BLD AUTO: 273 X10E3/UL (ref 150–450)
POTASSIUM SERPL-SCNC: 4.6 MMOL/L (ref 3.5–5.2)
PROT SERPL-MCNC: 7.5 G/DL (ref 6–8.5)
PSA SERPL-MCNC: 0.8 NG/ML (ref 0–4)
RBC # BLD AUTO: 5.54 X10E6/UL (ref 4.14–5.8)
REFLEX CRITERIA: NORMAL
SODIUM SERPL-SCNC: 141 MMOL/L (ref 134–144)
TRIGL SERPL-MCNC: 147 MG/DL (ref 0–149)
VLDLC SERPL CALC-MCNC: 26 MG/DL (ref 5–40)
WBC # BLD AUTO: 7.7 X10E3/UL (ref 3.4–10.8)

## 2022-01-21 NOTE — PROGRESS NOTES
Dominique Ortiz is a 55 y.o. male  Presenting for his annual checkup and health maintenance review and follow-up    He is playing a soccer since COVID-19. Alpha gal allergy. Has not had a recent reaction, but admits to less intense exercise. He does eat small amounts of red meat. No recent reaction. Seeing dermatology every 6 months. Did have a biopsy of a facial lesion. Benign. Exercise: moderately active  Diet: generally follows a low fat low cholesterol diet  Health Maintenance   Topic Date Due    Shingrix Vaccine Age 49> (1 of 2) Never done    DTaP/Tdap/Td series (2 - Td or Tdap) 03/25/2025    Colorectal Cancer Screening Combo  12/06/2026    Lipid Screen  01/19/2027    Hepatitis C Screening  Completed    Flu Vaccine  Completed    COVID-19 Vaccine  Completed    Pneumococcal 0-64 years  Aged Out     Health Maintenance reviewed  Last digital rectal exam:  2018  Lab Results   Component Value Date/Time    Prostate Specific Ag 0.8 01/19/2022 02:34 PM    Prostate Specific Ag 0.8 10/05/2020 02:40 PM    Prostate Specific Ag 0.8 04/09/2019 09:44 AM       Vaccinations reviewed  Immunization History   Administered Date(s) Administered    COVID-19, Pfizer Purple top, DILUTE for use, 12+ yrs, 30mcg/0.3mL dose 03/23/2021, 04/13/2021, 11/29/2021    Hep B Vaccine 01/01/2001    Influenza Vaccine 10/09/2014, 10/01/2018    Influenza Vaccine (>6 mo Afluria QUAD Vial 04050 (0.25 mL) / 58657 (0.5 mL)) 10/21/2020, 10/07/2021    Influenza Vaccine FlyClip) PF (>6 Mo Flulaval, Fluarix, and >3 Yrs [place], Fluzone 17853) 10/18/2016, 10/25/2017, 11/12/2019    Td 01/01/2001    Tdap 03/25/2015       Past Medical History:   Diagnosis Date    Agatston coronary artery calcium score less than 100 11/2020    score of 8    Allergy to alpha-gal     mild exertional beef, pork, lamb allergy 4/2019. 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 Sorin Hicks. wheat, beef.   alpha-gal ab + 4/2019    AR (allergic rhinitis)     Bell's palsy 1995    Bradycardia 2010    chronic, pulse 40-50. saw cardiology    FH: CAD (coronary artery disease)     father w stent    Melanoma [facility] 2008    sees Dr. Ingris Lin. Dr. Luis Mcrae. LLE. neg nodes/ 6 year clearance with Dr Choco Saenz 8/2014    Pulmonary nodules 11/2020    4 nodules on coronary CT, all less than 5 mm. all stable 5/2021    Shingles 2004    Skin cancer screening     Dr. Ingris Lin every 6 months    Urticaria due to heat 8/29/2016      has a past surgical history that includes hx other surgical (Left, 2008); hx acl reconstruction (Right, 2004); hx colonoscopy (12/06/2016); hx mohs procedure (02/2020); and hx acl reconstruction (Left, 05/22/2019). Beef containing products   Current Outpatient Medications   Medication Sig    ketoconazole (NIZORAL) 2 % topical cream 1 APPLICATION TO AREAS OF RASH TWICE A DAY    EPINEPHrine (EpiPen) 0.3 mg/0.3 mL injection 0.3 mL by IntraMUSCular route daily as needed for Allergic Response. No current facility-administered medications for this visit. SOCIAL HX:  reports that he has never smoked. He has never used smokeless tobacco. He reports current alcohol use. He reports that he does not use drugs. FAMILY HX: family history includes Coronary Art Dis (age of onset: 76) in his father; High Cholesterol in his mother; Hypertension in his mother; Liver Disease in his father; Other (age of onset: 2) in his brother; Other (age of onset: 62) in his mother. Review of Systems - History obtained from the patient  General ROS: negative for - night sweats, weight gain or weight loss  Cardiovascular ROS: no chest pain, dyspnea on exertion, edema    Physical exam  Blood pressure 124/82, pulse (!) 44, temperature 97.4 °F (36.3 °C), temperature source Oral, resp. rate 16, height 5' 7\" (1.702 m), weight 181 lb 9.6 oz (82.4 kg), SpO2 100 %.   Wt Readings from Last 3 Encounters:   01/19/22 181 lb 9.6 oz (82.4 kg)   10/05/20 177 lb (80.3 kg)   04/09/19 178 lb 2 oz (80.8 kg)     He appears well, alert and oriented x 3, pleasant and cooperative. Vitals as noted. No rashes or significant lesions. Neck supple and free of adenopathy, or masses. No thyromegaly or carotid bruits. Cranial nerves normal. Lungs are clear to auscultation. Heart sounds are normal with no murmurs, clicks, gallops or rubs. Abdomen is soft, non- tender, with no masses or organomegaly. Extremities, peripheral pulses and reflexes are normal.  . RECTAL/PROSTATE EXAM: deferred. Skin is without rashes or suspicious lesions. Diagnoses and all orders for this visit:    1. Preventative health care  55year-old. Very healthy. Borderline hyperlipidemia. Monitor. Recommend Shingrix. -     LIPID PANEL; Future  -     CBC W/O DIFF; Future  -     METABOLIC PANEL, COMPREHENSIVE; Future  -     PSA W/ REFLX FREE PSA; Future    2. History of melanoma  He is following up with Dr. Lucita Vasquez    3. Allergy to alpha-gal  Doing well with minimizing any red meat intake prior to exercise. He is presently able to tolerate some degree of meat and does not exercise. Has EpiPen as needed. 4. Encounter for hepatitis C screening test for low risk patient  -     HEPATITIS C AB; Future    5. FH: CAD (coronary artery disease)  Diagnosed with CAD in his 76s. Not early CAD. 6. Chronic bradycardia. Monitor closely for any symptoms. Refer to cardiology if needed. He is athletic          The patient is asked to make an attempt to improve diet and exercise patterns  Avoid tobacco products, excessive alcohol    Return for yearly wellness visits          Discussed the patient's BMI with him. The BMI follow up plan is as follows:     dietary management education, guidance, and counseling  encourage exercise  monitor weight  prescribed dietary intake    An After Visit Summary was printed and given to the patient.

## 2022-02-05 ENCOUNTER — HOSPITAL ENCOUNTER (EMERGENCY)
Age: 57
Discharge: HOME | End: 2022-02-05

## 2022-03-18 PROBLEM — R91.8 PULMONARY NODULES: Status: ACTIVE | Noted: 2020-11-01

## 2022-03-19 PROBLEM — R93.1 AGATSTON CORONARY ARTERY CALCIUM SCORE LESS THAN 100: Status: ACTIVE | Noted: 2018-11-01

## 2022-03-19 PROBLEM — Z85.820 HISTORY OF MELANOMA: Status: ACTIVE | Noted: 2018-03-26

## 2022-12-11 ENCOUNTER — HOSPITAL ENCOUNTER (EMERGENCY)
Age: 57
Discharge: HOME | End: 2022-12-11
Attending: EMERGENCY MEDICINE
Payer: COMMERCIAL

## 2022-12-11 ENCOUNTER — APPOINTMENT (OUTPATIENT)
Dept: GENERAL RADIOLOGY | Age: 57
End: 2022-12-11
Attending: EMERGENCY MEDICINE
Payer: COMMERCIAL

## 2022-12-11 VITALS
BODY MASS INDEX: 26.37 KG/M2 | RESPIRATION RATE: 16 BRPM | OXYGEN SATURATION: 99 % | DIASTOLIC BLOOD PRESSURE: 82 MMHG | TEMPERATURE: 98 F | HEIGHT: 67 IN | SYSTOLIC BLOOD PRESSURE: 148 MMHG | WEIGHT: 168 LBS | HEART RATE: 68 BPM

## 2022-12-11 DIAGNOSIS — S09.90XA CLOSED HEAD INJURY, INITIAL ENCOUNTER: ICD-10-CM

## 2022-12-11 DIAGNOSIS — S52.91XA CLOSED FRACTURE OF RIGHT FOREARM, INITIAL ENCOUNTER: Primary | ICD-10-CM

## 2022-12-11 DIAGNOSIS — S01.81XA FACIAL LACERATION, INITIAL ENCOUNTER: ICD-10-CM

## 2022-12-11 PROCEDURE — 74011250637 HC RX REV CODE- 250/637: Performed by: EMERGENCY MEDICINE

## 2022-12-11 PROCEDURE — 75810000293 HC SIMP/SUPERF WND  RPR

## 2022-12-11 PROCEDURE — 96372 THER/PROPH/DIAG INJ SC/IM: CPT

## 2022-12-11 PROCEDURE — 73110 X-RAY EXAM OF WRIST: CPT

## 2022-12-11 PROCEDURE — 74011250636 HC RX REV CODE- 250/636: Performed by: EMERGENCY MEDICINE

## 2022-12-11 PROCEDURE — 99284 EMERGENCY DEPT VISIT MOD MDM: CPT

## 2022-12-11 RX ORDER — HYDROMORPHONE HYDROCHLORIDE 1 MG/ML
0.5 INJECTION, SOLUTION INTRAMUSCULAR; INTRAVENOUS; SUBCUTANEOUS ONCE
Status: DISCONTINUED | OUTPATIENT
Start: 2022-12-11 | End: 2022-12-11

## 2022-12-11 RX ORDER — KETOROLAC TROMETHAMINE 30 MG/ML
30 INJECTION, SOLUTION INTRAMUSCULAR; INTRAVENOUS ONCE
Status: COMPLETED | OUTPATIENT
Start: 2022-12-11 | End: 2022-12-11

## 2022-12-11 RX ORDER — HYDROCODONE BITARTRATE AND ACETAMINOPHEN 5; 325 MG/1; MG/1
1 TABLET ORAL
Qty: 9 TABLET | Refills: 0 | Status: SHIPPED | OUTPATIENT
Start: 2022-12-11 | End: 2022-12-14

## 2022-12-11 RX ORDER — HYDROCODONE BITARTRATE AND ACETAMINOPHEN 5; 325 MG/1; MG/1
1 TABLET ORAL ONCE
Status: COMPLETED | OUTPATIENT
Start: 2022-12-11 | End: 2022-12-11

## 2022-12-11 RX ADMIN — KETOROLAC TROMETHAMINE 30 MG: 30 INJECTION, SOLUTION INTRAMUSCULAR; INTRAVENOUS at 21:41

## 2022-12-11 RX ADMIN — HYDROCODONE BITARTRATE AND ACETAMINOPHEN 1 TABLET: 5; 325 TABLET ORAL at 21:41

## 2022-12-12 NOTE — ED TRIAGE NOTES
Pt. Here for possible wrist fracture right arm, head injury with abrasion, tetanus up to date at this time. Denies LOC. Placed in sling in triage with ice pack. Slight deformity noted pulse is 3+ cap refill less than 3 secs.

## 2022-12-12 NOTE — ED PROVIDER NOTES
The history is provided by the patient. Wrist Pain   This is a new problem. The current episode started less than 1 hour ago. The problem occurs constantly. The problem has not changed since onset. The pain is present in the right wrist. The pain is moderate. Pertinent negatives include no numbness and full range of motion. The symptoms are aggravated by palpation. There has been a history of trauma (crashed into a wall while playing indoor soccer, striking his head and crushing his right wrist). Head Injury   The incident occurred just prior to arrival. The injury mechanism was a direct blow. Associated symptoms include headaches. Pertinent negatives include no numbness, no nausea, no vomiting, no light-headedness and no loss of consciousness. His tetanus status is UTD. Past Medical History:   Diagnosis Date    Agatston coronary artery calcium score less than 100 11/2020    score of 8    Allergy to alpha-gal     mild exertional beef, pork, lamb allergy 4/2019. Anaphylaxis due to exercise 07/2013    urticaria, saw allergist Dr. Marie Lou. wheat, beef. alpha-gal ab + 4/2019    AR (allergic rhinitis)     Bell's palsy 1995    Bradycardia 2010    chronic, pulse 40-50. saw cardiology    FH: CAD (coronary artery disease)     father w stent    Melanoma ([facility] [address] 2008    sees Dr. Mendoza Pass. Dr. Riley December. LLE. neg nodes/ 6 year clearance with Dr Jabari Roach 8/2014    Pulmonary nodules 11/2020    4 nodules on coronary CT, all less than 5 mm. all stable 5/2021    Shingles 2004    Skin cancer screening     Dr. Reji Baldwin every 6 months    Urticaria due to heat 8/29/2016       Past Surgical History:   Procedure Laterality Date    HX ACL RECONSTRUCTION Right 2004    Dr. Rochelle Bar ACL RECONSTRUCTION Left 05/22/2019    HX COLONOSCOPY  12/06/2016    Dr. Novak Amend- normal    HX MOHS PROCEDURES  02/2020    HX OTHER SURGICAL Left 2008    melanoma removal, LLE.   Dr. Jabari Roach         Family History:   Problem Relation Age of Onset Hypertension Mother     High Cholesterol Mother     Other Mother 62        brain aneurysm    Coronary Art Dis Father 76        stent    Liver Disease Father         fatty liver, cirrhosis (not etoh)    Other Brother 2         age 3 peritonitis/appendectomy       Social History     Socioeconomic History    Marital status:      Spouse name: Isamar Carias    Number of children: 1    Years of education: Not on file    Highest education level: Not on file   Occupational History    Occupation: [employer]     Employer: [employer]   Tobacco Use    Smoking status: Never    Smokeless tobacco: Never   Substance and Sexual Activity    Alcohol use: Yes     Comment: 2-3 beer/wine per week    Drug use: No    Sexual activity: Yes   Other Topics Concern    Not on file   Social History Narrative    17 yo daughter     Social Determinants of Health     Financial Resource Strain: Not on file   Food Insecurity: Not on file   Transportation Needs: Not on file   Physical Activity: Not on file   Stress: Not on file   Social Connections: Not on file   Intimate Partner Violence: Not on file   Housing Stability: Not on file         ALLERGIES: Beef containing products    Review of Systems   Gastrointestinal:  Negative for nausea and vomiting. Musculoskeletal:  Positive for arthralgias. Skin:  Positive for wound. Neurological:  Positive for headaches. Negative for loss of consciousness, syncope, light-headedness and numbness. All other systems reviewed and are negative. Vitals:    22 2039 22 2132   BP: (!) 156/89    Pulse: 63    Resp: 17    Temp: 98.2 °F (36.8 °C)    SpO2: 96%    Weight: 76.2 kg (168 lb)    Height:  5' 7\" (1.702 m)            Physical Exam  Vitals and nursing note reviewed. Constitutional:       Appearance: He is well-developed. He is not ill-appearing. HENT:      Head: Normocephalic and atraumatic. Eyes:      General: No scleral icterus.   Cardiovascular:      Rate and Rhythm: Normal rate.   Pulmonary:      Effort: Pulmonary effort is normal.   Abdominal:      General: There is no distension. Musculoskeletal:      Right wrist: Swelling, deformity and tenderness present. Decreased range of motion. Normal pulse. Cervical back: Normal range of motion. Skin:     General: Skin is warm and dry. Findings: No erythema or rash. Comments: Vertical 2 cm laceration to the forehead   Neurological:      General: No focal deficit present. Mental Status: He is alert and oriented to person, place, and time. Psychiatric:         Mood and Affect: Mood normal.         Behavior: Behavior normal.        MDM         Wound Closure by Adhesive    Date/Time: 12/12/2022 2:47 AM  Performed by: Aldo Navarro MD  Authorized by: Aldo Navarro MD     Consent:     Consent obtained:  Verbal    Consent given by:  Patient  Laceration details:     Location:  Face    Face location:  Forehead    Length (cm):  2  Exploration:     Hemostasis achieved with:  Direct pressure    Wound exploration: wound explored through full range of motion and entire depth of wound visualized      Wound extent: no foreign bodies/material noted    Treatment:     Area cleansed with:  Shur-Clens    Amount of cleaning:  Standard  Skin repair:     Repair method:  Steri-Strips    Number of Steri-Strips:  3  Approximation:     Approximation:  Close  Repair type:     Repair type:  Simple  Post-procedure details:     Dressing:  Open (no dressing)    Procedure completion:  Tolerated well, no immediate complications      Pt presents with an extremity injury found to have a fracture without dislocation, or other significant musculoskeletal injury. Patient was discharged home with a plan for pain control as well as instructions on managing his injuries and precautions for returning to the emergency department. No evidence of compartment syndrome, concussion, ICH on evaluation.  Patient will be discharged home to follow-up with  Natasha tomorrow as instructed in discharge paperwork and in accordance with recommendations from Dr. Ester Monet. Patient and family expressed understanding and agreed with plan. MEDICATIONS GIVEN:  Medications   ketorolac (TORADOL) injection 30 mg (30 mg IntraMUSCular Given 12/11/22 2141)   HYDROcodone-acetaminophen (NORCO) 5-325 mg per tablet 1 Tablet (1 Tablet Oral Given 12/11/22 2141)       IMPRESSION:  1. Closed fracture of right forearm, initial encounter    2. Closed head injury, initial encounter    3.  Facial laceration, initial encounter

## 2023-07-20 ENCOUNTER — TELEPHONE (OUTPATIENT)
Age: 58
End: 2023-07-20

## 2023-07-20 NOTE — TELEPHONE ENCOUNTER
PSR reached out to patient to reschedule appointment due to provider being out of the office - no answer, left detailed VM with date/time change   If patient returns call, please assist in rescheduling if necessary

## 2023-10-23 ENCOUNTER — OFFICE VISIT (OUTPATIENT)
Age: 58
End: 2023-10-23
Payer: COMMERCIAL

## 2023-10-23 VITALS
OXYGEN SATURATION: 97 % | RESPIRATION RATE: 19 BRPM | WEIGHT: 184.4 LBS | DIASTOLIC BLOOD PRESSURE: 76 MMHG | HEART RATE: 50 BPM | TEMPERATURE: 97.4 F | HEIGHT: 67 IN | SYSTOLIC BLOOD PRESSURE: 128 MMHG | BODY MASS INDEX: 28.94 KG/M2

## 2023-10-23 DIAGNOSIS — Z85.820 HISTORY OF MELANOMA: ICD-10-CM

## 2023-10-23 DIAGNOSIS — Z00.00 PREVENTATIVE HEALTH CARE: Primary | ICD-10-CM

## 2023-10-23 DIAGNOSIS — Z91.018 ALLERGY TO ALPHA-GAL: ICD-10-CM

## 2023-10-23 DIAGNOSIS — R91.8 PULMONARY NODULES: ICD-10-CM

## 2023-10-23 DIAGNOSIS — H61.23 EXCESSIVE CERUMEN IN BOTH EAR CANALS: ICD-10-CM

## 2023-10-23 DIAGNOSIS — Z11.4 SCREENING FOR HIV WITHOUT PRESENCE OF RISK FACTORS: ICD-10-CM

## 2023-10-23 PROCEDURE — 99396 PREV VISIT EST AGE 40-64: CPT | Performed by: INTERNAL MEDICINE

## 2023-10-23 RX ORDER — EPINEPHRINE 0.3 MG/.3ML
0.3 INJECTION SUBCUTANEOUS DAILY PRN
Qty: 2 EACH | Refills: 2 | Status: SHIPPED | OUTPATIENT
Start: 2023-10-23

## 2023-10-23 RX ORDER — ACETAMINOPHEN 160 MG
TABLET,DISINTEGRATING ORAL DAILY
COMMUNITY

## 2023-10-23 RX ORDER — ZOSTER VACCINE RECOMBINANT, ADJUVANTED 50 MCG/0.5
0.5 KIT INTRAMUSCULAR SEE ADMIN INSTRUCTIONS
Qty: 0.5 ML | Refills: 0 | Status: SHIPPED | OUTPATIENT
Start: 2023-10-23 | End: 2024-04-20

## 2023-10-23 SDOH — ECONOMIC STABILITY: FOOD INSECURITY: WITHIN THE PAST 12 MONTHS, YOU WORRIED THAT YOUR FOOD WOULD RUN OUT BEFORE YOU GOT MONEY TO BUY MORE.: NEVER TRUE

## 2023-10-23 SDOH — ECONOMIC STABILITY: INCOME INSECURITY: HOW HARD IS IT FOR YOU TO PAY FOR THE VERY BASICS LIKE FOOD, HOUSING, MEDICAL CARE, AND HEATING?: NOT HARD AT ALL

## 2023-10-23 SDOH — ECONOMIC STABILITY: FOOD INSECURITY: WITHIN THE PAST 12 MONTHS, THE FOOD YOU BOUGHT JUST DIDN'T LAST AND YOU DIDN'T HAVE MONEY TO GET MORE.: NEVER TRUE

## 2023-10-23 SDOH — ECONOMIC STABILITY: HOUSING INSECURITY
IN THE LAST 12 MONTHS, WAS THERE A TIME WHEN YOU DID NOT HAVE A STEADY PLACE TO SLEEP OR SLEPT IN A SHELTER (INCLUDING NOW)?: NO

## 2023-10-23 ASSESSMENT — PATIENT HEALTH QUESTIONNAIRE - PHQ9
SUM OF ALL RESPONSES TO PHQ QUESTIONS 1-9: 0
SUM OF ALL RESPONSES TO PHQ QUESTIONS 1-9: 0
SUM OF ALL RESPONSES TO PHQ9 QUESTIONS 1 & 2: 0
SUM OF ALL RESPONSES TO PHQ QUESTIONS 1-9: 0
1. LITTLE INTEREST OR PLEASURE IN DOING THINGS: 0
2. FEELING DOWN, DEPRESSED OR HOPELESS: 0
SUM OF ALL RESPONSES TO PHQ QUESTIONS 1-9: 0

## 2023-10-23 NOTE — PROGRESS NOTES
nodules  History of small pulmonary nodules. Last CT May 2021 showed unchanged sub-6 cm nodules compared to 6 months prior. Since he has had melanoma, it is reasonable to repeat CT scan although medical necessity can be questioned. - CT CHEST WO CONTRAST; Future    5. History of melanoma  Has frequent follow-up scheduled with dermatology. - CT CHEST WO CONTRAST; Future    6. Excessive cerumen in both ear canals  I was able to extract moderate amounts of cerumen using curette, but mild canal bleeding on the left. .  Could consider irrigation but patient declined. Recommend home therapy with Debrox, flushing.       The patient is asked to make an attempt to improve diet and exercise patterns  Avoid tobacco products, excessive alcohol    Return for yearly wellness visits

## 2023-10-25 LAB
ALBUMIN SERPL-MCNC: 4.6 G/DL (ref 3.8–4.9)
ALBUMIN/GLOB SERPL: 1.8 {RATIO} (ref 1.2–2.2)
ALP SERPL-CCNC: 101 IU/L (ref 44–121)
ALT SERPL-CCNC: 46 IU/L (ref 0–44)
AST SERPL-CCNC: 30 IU/L (ref 0–40)
BILIRUB SERPL-MCNC: 0.6 MG/DL (ref 0–1.2)
BUN SERPL-MCNC: 15 MG/DL (ref 6–24)
BUN/CREAT SERPL: 16 (ref 9–20)
CALCIUM SERPL-MCNC: 9.2 MG/DL (ref 8.7–10.2)
CHLORIDE SERPL-SCNC: 103 MMOL/L (ref 96–106)
CHOLEST SERPL-MCNC: 212 MG/DL (ref 100–199)
CO2 SERPL-SCNC: 23 MMOL/L (ref 20–29)
CREAT SERPL-MCNC: 0.96 MG/DL (ref 0.76–1.27)
EGFRCR SERPLBLD CKD-EPI 2021: 92 ML/MIN/1.73
ERYTHROCYTE [DISTWIDTH] IN BLOOD BY AUTOMATED COUNT: 13.8 % (ref 11.6–15.4)
GLOBULIN SER CALC-MCNC: 2.5 G/DL (ref 1.5–4.5)
GLUCOSE SERPL-MCNC: 103 MG/DL (ref 70–99)
HCT VFR BLD AUTO: 44.1 % (ref 37.5–51)
HDLC SERPL-MCNC: 47 MG/DL
HGB BLD-MCNC: 14.4 G/DL (ref 13–17.7)
HIV 1+2 AB+HIV1 P24 AG SERPL QL IA: NON REACTIVE
IMP & REVIEW OF LAB RESULTS: NORMAL
LDLC SERPL CALC-MCNC: 147 MG/DL (ref 0–99)
MCH RBC QN AUTO: 29.4 PG (ref 26.6–33)
MCHC RBC AUTO-ENTMCNC: 32.7 G/DL (ref 31.5–35.7)
MCV RBC AUTO: 90 FL (ref 79–97)
PLATELET # BLD AUTO: 323 X10E3/UL (ref 150–450)
POTASSIUM SERPL-SCNC: 4.5 MMOL/L (ref 3.5–5.2)
PROT SERPL-MCNC: 7.1 G/DL (ref 6–8.5)
PSA SERPL-MCNC: 0.9 NG/ML (ref 0–4)
RBC # BLD AUTO: 4.9 X10E6/UL (ref 4.14–5.8)
SODIUM SERPL-SCNC: 140 MMOL/L (ref 134–144)
TRIGL SERPL-MCNC: 102 MG/DL (ref 0–149)
VLDLC SERPL CALC-MCNC: 18 MG/DL (ref 5–40)
WBC # BLD AUTO: 6.2 X10E3/UL (ref 3.4–10.8)

## 2023-12-07 ENCOUNTER — HOSPITAL ENCOUNTER (OUTPATIENT)
Facility: HOSPITAL | Age: 58
Discharge: HOME | End: 2023-12-07
Attending: INTERNAL MEDICINE
Payer: COMMERCIAL

## 2023-12-07 DIAGNOSIS — R91.8 PULMONARY NODULES: ICD-10-CM

## 2023-12-07 DIAGNOSIS — Z85.820 HISTORY OF MELANOMA: ICD-10-CM

## 2023-12-07 PROCEDURE — 71250 CT THORAX DX C-: CPT

## 2024-05-08 ENCOUNTER — HOSPITAL ENCOUNTER (OUTPATIENT)
Facility: HOSPITAL | Age: 59
Discharge: HOME | End: 2024-05-11
Attending: OTOLARYNGOLOGY
Payer: COMMERCIAL

## 2024-05-08 DIAGNOSIS — H90.41 SNSRNRL HEAR LOSS, UNI, RIGHT EAR, W UNRESTR HEAR CNTRA SIDE: ICD-10-CM

## 2024-05-08 PROCEDURE — 70553 MRI BRAIN STEM W/O & W/DYE: CPT

## 2024-05-08 PROCEDURE — 6360000004 HC RX CONTRAST MEDICATION: Performed by: OTOLARYNGOLOGY

## 2024-05-08 PROCEDURE — A9579 GAD-BASE MR CONTRAST NOS,1ML: HCPCS | Performed by: OTOLARYNGOLOGY

## 2024-05-08 RX ADMIN — GADOTERIDOL 15 ML: 279.3 INJECTION, SOLUTION INTRAVENOUS at 21:35

## 2025-02-19 ENCOUNTER — OFFICE VISIT (OUTPATIENT)
Facility: CLINIC | Age: 60
End: 2025-02-19

## 2025-02-19 VITALS
DIASTOLIC BLOOD PRESSURE: 92 MMHG | BODY MASS INDEX: 28.53 KG/M2 | HEART RATE: 53 BPM | OXYGEN SATURATION: 98 % | WEIGHT: 181.8 LBS | SYSTOLIC BLOOD PRESSURE: 144 MMHG | HEIGHT: 67 IN | TEMPERATURE: 98.1 F

## 2025-02-19 DIAGNOSIS — R03.0 ELEVATED BLOOD PRESSURE READING IN OFFICE WITHOUT DIAGNOSIS OF HYPERTENSION: ICD-10-CM

## 2025-02-19 DIAGNOSIS — Z12.11 SCREEN FOR COLON CANCER: ICD-10-CM

## 2025-02-19 DIAGNOSIS — Z00.00 PREVENTATIVE HEALTH CARE: Primary | ICD-10-CM

## 2025-02-19 DIAGNOSIS — Z00.00 PREVENTATIVE HEALTH CARE: ICD-10-CM

## 2025-02-19 DIAGNOSIS — H61.21 IMPACTED CERUMEN OF RIGHT EAR: ICD-10-CM

## 2025-02-19 DIAGNOSIS — E78.00 PURE HYPERCHOLESTEROLEMIA: ICD-10-CM

## 2025-02-19 DIAGNOSIS — Z91.018 ALLERGY TO ALPHA-GAL: ICD-10-CM

## 2025-02-19 DIAGNOSIS — Z85.820 HISTORY OF MELANOMA: ICD-10-CM

## 2025-02-19 SDOH — ECONOMIC STABILITY: FOOD INSECURITY: WITHIN THE PAST 12 MONTHS, THE FOOD YOU BOUGHT JUST DIDN'T LAST AND YOU DIDN'T HAVE MONEY TO GET MORE.: NEVER TRUE

## 2025-02-19 SDOH — ECONOMIC STABILITY: FOOD INSECURITY: WITHIN THE PAST 12 MONTHS, YOU WORRIED THAT YOUR FOOD WOULD RUN OUT BEFORE YOU GOT MONEY TO BUY MORE.: NEVER TRUE

## 2025-02-19 ASSESSMENT — PATIENT HEALTH QUESTIONNAIRE - PHQ9
SUM OF ALL RESPONSES TO PHQ9 QUESTIONS 1 & 2: 0
SUM OF ALL RESPONSES TO PHQ QUESTIONS 1-9: 0
SUM OF ALL RESPONSES TO PHQ QUESTIONS 1-9: 0
2. FEELING DOWN, DEPRESSED OR HOPELESS: NOT AT ALL
SUM OF ALL RESPONSES TO PHQ QUESTIONS 1-9: 0
1. LITTLE INTEREST OR PLEASURE IN DOING THINGS: NOT AT ALL
SUM OF ALL RESPONSES TO PHQ QUESTIONS 1-9: 0

## 2025-02-19 NOTE — PROGRESS NOTES
Dain Thompson is a 59 y.o. male  Presenting for his annual checkup and health maintenance review and follow-up    Saw ENT 3/2024 for R tinnitus.  Given steroid without relief.  MRI normal.  Still has mild s/s.  Viral?      CT chest 12/7/23 showed small stable 3m R fissure and L subplueural nodule, stable from 5/2021    Seeing dermatology every 6 months for screening      Alpha gal.  He is exercising without any allergic rxns.  Eating red meat, but not on days he plays soccer..      Asks about CV risk.  Wife asks about NMR profile.  CCS in 2020 score of 8.    Father w CAD age 76  Lab Results   Component Value Date    CHOL 212 (H) 10/23/2023    TRIG 102 10/23/2023    HDL 47 10/23/2023     (H) 10/23/2023    VLDL 18 10/23/2023     Lifestyle: generally follows a low fat low cholesterol diet, exercises sporadically    Health Maintenance   Topic Date Due    Pneumococcal 50+ years Vaccine (1 of 1 - PCV) Never done    Diabetes screen  02/19/2026 (Originally 6/1/2001)    DTaP/Tdap/Td vaccine (3 - Td or Tdap) 03/25/2025    Depression Screen  02/19/2026    Colorectal Cancer Screen  12/06/2026    Lipids  10/23/2028    Flu vaccine  Completed    Shingles vaccine  Completed    COVID-19 Vaccine  Completed    Hepatitis C screen  Completed    HIV screen  Completed    Hepatitis A vaccine  Aged Out    Hib vaccine  Aged Out    Polio vaccine  Aged Out    Meningococcal (ACWY) vaccine  Aged Out    Hepatitis B vaccine  Discontinued     Health Maintenance reviewed  Last digital rectal exam:  none  Lab Results   Component Value Date/Time    PSA 0.9 10/23/2023 12:00 AM    PSA 0.8 01/19/2022 02:34 PM    PSA 0.8 10/05/2020 02:40 PM       Vaccinations reviewed  Immunization History   Administered Date(s) Administered    COVID-19, PFIZER Bivalent, DO NOT Dilute, (age 12y+), IM, 30 mcg/0.3 mL 10/18/2022    COVID-19, PFIZER PURPLE top, DILUTE for use, (age 12 y+), 30mcg/0.3mL 03/23/2021, 04/13/2021, 11/29/2021, 05/25/2022    COVID-19, PFIZER,

## 2025-02-19 NOTE — PROGRESS NOTES
Identified pt with two pt identifiers(name and ). Reviewed record in preparation for visit and have obtained necessary documentation. All patient medications has been reviewed.  Chief Complaint   Patient presents with    Annual Exam     *Immunization records updated*    Health Maintenance Due   Topic    Pneumococcal 50+ years Vaccine (1 of  - PCV)    Depression Screen      Health Maintenance Review: Patient reminded of \"due or due soon\" health maintenance. I have asked the patient to contact his/her primary care provider (PCP) for follow-up on his/her health maintenance.    Wt Readings from Last 3 Encounters:   25 82.5 kg (181 lb 12.8 oz)   10/23/23 83.6 kg (184 lb 6.4 oz)   22 82.4 kg (181 lb 9.6 oz)     Temp Readings from Last 3 Encounters:   25 98.1 °F (36.7 °C)   10/23/23 97.4 °F (36.3 °C) (Oral)     BP Readings from Last 3 Encounters:   25 (!) 149/96   10/23/23 128/76   22 124/82     Pulse Readings from Last 3 Encounters:   25 53   10/23/23 50   22 (!) 44       1. \"Have you been to the ER, urgent care clinic since your last visit?  Hospitalized since your last visit?\" No    2. \"Have you seen or consulted any other health care providers outside of the [facility] since your last visit?\" Yes ENT      3. For patients aged 45-75: Has the patient had a colonoscopy / FIT/ Cologuard? Yes - Care Gap present. Most recent result on file    Patient is accompanied by self I have received verbal consent from Dain Thompson to discuss any/all medical information while they are present in the room.

## 2025-02-20 LAB
ALBUMIN SERPL-MCNC: 4.6 G/DL (ref 3.8–4.9)
ALP SERPL-CCNC: 72 IU/L (ref 44–121)
ALT SERPL-CCNC: 23 IU/L (ref 0–44)
APO B SERPL-MCNC: 104 MG/DL
APPEARANCE UR: CLEAR
AST SERPL-CCNC: 20 IU/L (ref 0–40)
BACTERIA #/AREA URNS HPF: NORMAL /[HPF]
BILIRUB SERPL-MCNC: 0.5 MG/DL (ref 0–1.2)
BILIRUB UR QL STRIP: NEGATIVE
BUN SERPL-MCNC: 13 MG/DL (ref 6–24)
BUN/CREAT SERPL: 13 (ref 9–20)
CALCIUM SERPL-MCNC: 9.1 MG/DL (ref 8.7–10.2)
CASTS URNS QL MICRO: NORMAL /LPF
CHLORIDE SERPL-SCNC: 104 MMOL/L (ref 96–106)
CO2 SERPL-SCNC: 25 MMOL/L (ref 20–29)
COLOR UR: YELLOW
CREAT SERPL-MCNC: 1.03 MG/DL (ref 0.76–1.27)
EGFRCR SERPLBLD CKD-EPI 2021: 84 ML/MIN/1.73
EPI CELLS #/AREA URNS HPF: NORMAL /HPF (ref 0–10)
ERYTHROCYTE [DISTWIDTH] IN BLOOD BY AUTOMATED COUNT: 13.8 % (ref 11.6–15.4)
GLOBULIN SER CALC-MCNC: 2.3 G/DL (ref 1.5–4.5)
GLUCOSE SERPL-MCNC: 95 MG/DL (ref 70–99)
GLUCOSE UR QL STRIP: NEGATIVE
HCT VFR BLD AUTO: 44 % (ref 37.5–51)
HGB BLD-MCNC: 14.5 G/DL (ref 13–17.7)
HGB UR QL STRIP: NEGATIVE
KETONES UR QL STRIP: NEGATIVE
LEUKOCYTE ESTERASE UR QL STRIP: NEGATIVE
MCH RBC QN AUTO: 28.4 PG (ref 26.6–33)
MCHC RBC AUTO-ENTMCNC: 33 G/DL (ref 31.5–35.7)
MCV RBC AUTO: 86 FL (ref 79–97)
MICRO URNS: NORMAL
MICRO URNS: NORMAL
NITRITE UR QL STRIP: NEGATIVE
PH UR STRIP: 6 [PH] (ref 5–7.5)
PLATELET # BLD AUTO: 281 X10E3/UL (ref 150–450)
POTASSIUM SERPL-SCNC: 4.7 MMOL/L (ref 3.5–5.2)
PROT SERPL-MCNC: 6.9 G/DL (ref 6–8.5)
PROT UR QL STRIP: NEGATIVE
PSA SERPL-MCNC: 0.9 NG/ML (ref 0–4)
RBC # BLD AUTO: 5.1 X10E6/UL (ref 4.14–5.8)
RBC #/AREA URNS HPF: NORMAL /HPF (ref 0–2)
SODIUM SERPL-SCNC: 142 MMOL/L (ref 134–144)
SP GR UR STRIP: 1.02 (ref 1–1.03)
TSH SERPL DL<=0.005 MIU/L-ACNC: 1.64 UIU/ML (ref 0.45–4.5)
UROBILINOGEN UR STRIP-MCNC: 0.2 MG/DL (ref 0.2–1)
WBC # BLD AUTO: 5.9 X10E3/UL (ref 3.4–10.8)
WBC #/AREA URNS HPF: NORMAL /HPF (ref 0–5)

## 2025-02-21 LAB
CHOLEST SERPL-MCNC: 207 MG/DL (ref 100–199)
HDL SERPL-SCNC: 32 UMOL/L
HDLC SERPL-MCNC: 53 MG/DL
IMP & REVIEW OF LAB RESULTS: NORMAL
LDL SERPL QN: 21.2 NM
LDL SERPL-SCNC: 1431 NMOL/L
LDL SMALL SERPL-SCNC: 389 NMOL/L
LDLC SERPL CALC-MCNC: 137 MG/DL (ref 0–99)
LP-IR SCORE SERPL: 51
LPA SERPL-SCNC: 20.7 NMOL/L
TRIGL SERPL-MCNC: 95 MG/DL (ref 0–149)

## 2025-02-25 DIAGNOSIS — E78.00 PURE HYPERCHOLESTEROLEMIA: Primary | ICD-10-CM

## 2025-02-25 DIAGNOSIS — Z82.49 FH: CAD (CORONARY ARTERY DISEASE): ICD-10-CM

## 2025-04-23 ENCOUNTER — HOSPITAL ENCOUNTER (OUTPATIENT)
Facility: HOSPITAL | Age: 60
Discharge: HOME | End: 2025-04-26
Attending: INTERNAL MEDICINE

## 2025-04-23 DIAGNOSIS — Z82.49 FH: CAD (CORONARY ARTERY DISEASE): ICD-10-CM

## 2025-04-23 DIAGNOSIS — E78.00 PURE HYPERCHOLESTEROLEMIA: ICD-10-CM

## 2025-04-23 PROCEDURE — 75571 CT HRT W/O DYE W/CA TEST: CPT

## 2025-04-27 ENCOUNTER — RESULTS FOLLOW-UP (OUTPATIENT)
Facility: CLINIC | Age: 60
End: 2025-04-27

## 2025-04-27 DIAGNOSIS — E78.00 PURE HYPERCHOLESTEROLEMIA: Primary | ICD-10-CM

## 2025-04-28 DIAGNOSIS — E78.00 PURE HYPERCHOLESTEROLEMIA: Primary | ICD-10-CM

## 2025-04-28 DIAGNOSIS — R93.1 AGATSTON CORONARY ARTERY CALCIUM SCORE LESS THAN 100: ICD-10-CM

## 2025-04-28 RX ORDER — ROSUVASTATIN CALCIUM 10 MG/1
10 TABLET, COATED ORAL DAILY
Qty: 90 TABLET | Refills: 1 | Status: SHIPPED | OUTPATIENT
Start: 2025-04-28

## 2025-04-30 RX ORDER — ROSUVASTATIN CALCIUM 10 MG/1
10 TABLET, COATED ORAL DAILY
Qty: 30 TABLET | Refills: 0 | Status: SHIPPED | OUTPATIENT
Start: 2025-04-30